# Patient Record
Sex: MALE | Race: WHITE | NOT HISPANIC OR LATINO | Employment: OTHER | URBAN - METROPOLITAN AREA
[De-identification: names, ages, dates, MRNs, and addresses within clinical notes are randomized per-mention and may not be internally consistent; named-entity substitution may affect disease eponyms.]

---

## 2023-06-09 ENCOUNTER — OFFICE VISIT (OUTPATIENT)
Dept: URGENT CARE | Facility: CLINIC | Age: 23
End: 2023-06-09
Payer: COMMERCIAL

## 2023-06-09 VITALS
OXYGEN SATURATION: 98 % | RESPIRATION RATE: 16 BRPM | WEIGHT: 250 LBS | DIASTOLIC BLOOD PRESSURE: 70 MMHG | TEMPERATURE: 97.8 F | HEIGHT: 68 IN | HEART RATE: 73 BPM | SYSTOLIC BLOOD PRESSURE: 124 MMHG | BODY MASS INDEX: 37.89 KG/M2

## 2023-06-09 DIAGNOSIS — S16.1XXA STRAIN OF NECK MUSCLE, INITIAL ENCOUNTER: ICD-10-CM

## 2023-06-09 DIAGNOSIS — M62.838 TRAPEZIUS MUSCLE SPASM: Primary | ICD-10-CM

## 2023-06-09 PROCEDURE — 99213 OFFICE O/P EST LOW 20 MIN: CPT

## 2023-06-09 RX ORDER — IBUPROFEN 600 MG/1
600 TABLET ORAL EVERY 6 HOURS PRN
Qty: 30 TABLET | Refills: 0 | Status: SHIPPED | OUTPATIENT
Start: 2023-06-09 | End: 2023-06-16

## 2023-06-09 RX ORDER — CYCLOBENZAPRINE HCL 10 MG
10 TABLET ORAL 3 TIMES DAILY PRN
Qty: 10 TABLET | Refills: 0 | Status: SHIPPED | OUTPATIENT
Start: 2023-06-09

## 2023-06-09 NOTE — PATIENT INSTRUCTIONS
Take Motrin as directed  Use Flexeril at night - may cause drowsiness, use with caution during the day  Moist heat  Follow up with chiropractor  Consider massage therapist   Follow up with PCP in 3-5 days  Proceed to  ER if symptoms worsen

## 2023-06-09 NOTE — PROGRESS NOTES
[address] [facility]        NAME: Yvon Schuster is a 24 y o  male  : 2000    MRN: [id number]  DATE: 2023  TIME: 9:36 AM    Assessment and Plan   Trapezius muscle spasm [M62 838]  1  Trapezius muscle spasm  cyclobenzaprine (FLEXERIL) 10 mg tablet    ibuprofen (MOTRIN) 600 mg tablet      2  Strain of neck muscle, initial encounter  ibuprofen (MOTRIN) 600 mg tablet            Patient Instructions     Take Motrin as directed  Use Flexeril at night - may cause drowsiness, use with caution during the day  Moist heat  Follow up with chiropractor  Consider massage therapist   Follow up with PCP in 3-5 days  Proceed to  ER if symptoms worsen  Chief Complaint     Chief Complaint   Patient presents with   • Neck Pain     Pt reports left side posterior neck pain with radiation into left arm  States onset of symptoms Tuesday morning with worsening symptoms last night  Managing with Tylenol last dose one hour ago  Denies any known injury  History of Present Illness       Neck Pain   This is a new problem  Episode onset: Tuesday  The problem occurs constantly  The problem has been gradually worsening  The pain is associated with a sleep position  The pain is present in the left side  The quality of the pain is described as aching  The symptoms are aggravated by position  The pain is same all the time  Stiffness is present all day  Associated symptoms include weakness  Pertinent negatives include no chest pain, fever, numbness or tingling  He has tried acetaminophen for the symptoms  The treatment provided mild relief  He states it is not uncommon for him to awaken with a stiff neck but it usually resolves within a few hours  This has progressively worsened and last night he could  Hardly sleep  He states the left arm felt weak yesterday  He is getting pain to his shoulder but no pain down the arm  Review of Systems   Review of Systems   Constitutional: Negative for chills and fever     HENT: "Negative for ear pain and sore throat  Eyes: Negative for pain and visual disturbance  Respiratory: Negative for cough and shortness of breath  Cardiovascular: Negative for chest pain and palpitations  Gastrointestinal: Negative for abdominal pain and vomiting  Genitourinary: Negative for dysuria and hematuria  Musculoskeletal: Positive for neck pain  Negative for arthralgias and back pain  Skin: Negative for color change and rash  Neurological: Positive for weakness  Negative for tingling, seizures, syncope and numbness  All other systems reviewed and are negative  Current Medications       Current Outpatient Medications:   •  cyclobenzaprine (FLEXERIL) 10 mg tablet, Take 1 tablet (10 mg total) by mouth 3 (three) times a day as needed for muscle spasms for up to 10 doses, Disp: 10 tablet, Rfl: 0  •  ibuprofen (MOTRIN) 600 mg tablet, Take 1 tablet (600 mg total) by mouth every 6 (six) hours as needed for mild pain for up to 7 days, Disp: 30 tablet, Rfl: 0    Current Allergies     Allergies as of 06/09/2023   • (No Known Allergies)            The following portions of the patient's history were reviewed and updated as appropriate: allergies, current medications, past family history, past medical history, past social history, past surgical history and problem list      No past medical history on file  No past surgical history on file  No family history on file  Medications have been verified  Objective   /70 (BP Location: Right arm, Patient Position: Sitting)   Pulse 73   Temp 97 8 °F (36 6 °C)   Resp 16   Ht 5' 8\" (1 727 m)   Wt 113 kg (250 lb)   SpO2 98%   BMI 38 01 kg/m²   No LMP for male patient  Physical Exam     Physical Exam  Vitals and nursing note reviewed  Constitutional:       Appearance: Normal appearance  He is not ill-appearing  HENT:      Head: Normocephalic and atraumatic        Nose: Nose normal    Cardiovascular:      Rate and Rhythm: " Normal rate and regular rhythm  Pulses: Normal pulses  Heart sounds: Normal heart sounds  Pulmonary:      Effort: Pulmonary effort is normal       Breath sounds: Normal breath sounds  Musculoskeletal:      Comments: Severe restrictions in cervical motion in all planes  Unable to perform Spurling's maneuver due to lack of motion  Tender in the left trapezius and left cervical paraspinals    5/5 strength: triceps/biceps/wrist flexion/extension/ and finger adduction    DTR: 1 + at triceps/biceps/brachioradialis bilat    Left shoulder:   Full passive ROM without pain    Skin:     General: Skin is warm and dry  Neurological:      Mental Status: He is alert and oriented to person, place, and time     Psychiatric:         Mood and Affect: Mood normal          Behavior: Behavior normal